# Patient Record
Sex: FEMALE | Race: OTHER | NOT HISPANIC OR LATINO | Employment: FULL TIME | URBAN - METROPOLITAN AREA
[De-identification: names, ages, dates, MRNs, and addresses within clinical notes are randomized per-mention and may not be internally consistent; named-entity substitution may affect disease eponyms.]

---

## 2024-06-02 ENCOUNTER — APPOINTMENT (OUTPATIENT)
Dept: GENERAL RADIOLOGY | Facility: HOSPITAL | Age: 64
End: 2024-06-02
Payer: COMMERCIAL

## 2024-06-02 ENCOUNTER — HOSPITAL ENCOUNTER (EMERGENCY)
Facility: HOSPITAL | Age: 64
Discharge: HOME | End: 2024-06-02
Attending: EMERGENCY MEDICINE | Admitting: EMERGENCY MEDICINE
Payer: COMMERCIAL

## 2024-06-02 ENCOUNTER — APPOINTMENT (OUTPATIENT)
Dept: CT IMAGING | Facility: HOSPITAL | Age: 64
End: 2024-06-02
Payer: COMMERCIAL

## 2024-06-02 VITALS
OXYGEN SATURATION: 97 % | SYSTOLIC BLOOD PRESSURE: 118 MMHG | WEIGHT: 195.5 LBS | RESPIRATION RATE: 20 BRPM | HEART RATE: 59 BPM | HEIGHT: 63 IN | TEMPERATURE: 97.3 F | BODY MASS INDEX: 34.64 KG/M2 | DIASTOLIC BLOOD PRESSURE: 67 MMHG

## 2024-06-02 DIAGNOSIS — R42 DIZZINESS, NONSPECIFIC: Primary | ICD-10-CM

## 2024-06-02 LAB
ALBUMIN SERPL-MCNC: 3.7 G/DL (ref 3.5–5.2)
ALBUMIN/GLOB SERPL: 1.6 G/DL
ALP SERPL-CCNC: 76 U/L (ref 39–117)
ALT SERPL W P-5'-P-CCNC: 19 U/L (ref 1–33)
ANION GAP SERPL CALCULATED.3IONS-SCNC: 9 MMOL/L (ref 5–15)
AST SERPL-CCNC: 16 U/L (ref 1–32)
BACTERIA UR QL AUTO: NORMAL /HPF
BASOPHILS # BLD AUTO: 0.03 10*3/MM3 (ref 0–0.2)
BASOPHILS NFR BLD AUTO: 0.4 % (ref 0–1.5)
BILIRUB SERPL-MCNC: 0.4 MG/DL (ref 0–1.2)
BILIRUB UR QL STRIP: NEGATIVE
BUN SERPL-MCNC: 17 MG/DL (ref 8–23)
BUN/CREAT SERPL: 23.9 (ref 7–25)
CALCIUM SPEC-SCNC: 9 MG/DL (ref 8.6–10.5)
CHLORIDE SERPL-SCNC: 103 MMOL/L (ref 98–107)
CLARITY UR: CLEAR
CO2 SERPL-SCNC: 26 MMOL/L (ref 22–29)
COLOR UR: YELLOW
CREAT SERPL-MCNC: 0.71 MG/DL (ref 0.57–1)
DEPRECATED RDW RBC AUTO: 43 FL (ref 37–54)
EGFRCR SERPLBLD CKD-EPI 2021: 94.5 ML/MIN/1.73
EOSINOPHIL # BLD AUTO: 0.45 10*3/MM3 (ref 0–0.4)
EOSINOPHIL NFR BLD AUTO: 5.8 % (ref 0.3–6.2)
ERYTHROCYTE [DISTWIDTH] IN BLOOD BY AUTOMATED COUNT: 12 % (ref 12.3–15.4)
GLOBULIN UR ELPH-MCNC: 2.3 GM/DL
GLUCOSE BLDC GLUCOMTR-MCNC: 120 MG/DL (ref 70–130)
GLUCOSE SERPL-MCNC: 116 MG/DL (ref 65–99)
GLUCOSE UR STRIP-MCNC: NEGATIVE MG/DL
HCT VFR BLD AUTO: 44.8 % (ref 34–46.6)
HGB BLD-MCNC: 14 G/DL (ref 12–15.9)
HGB UR QL STRIP.AUTO: NEGATIVE
HOLD SPECIMEN: NORMAL
HYALINE CASTS UR QL AUTO: NORMAL /LPF
IMM GRANULOCYTES # BLD AUTO: 0.04 10*3/MM3 (ref 0–0.05)
IMM GRANULOCYTES NFR BLD AUTO: 0.5 % (ref 0–0.5)
KETONES UR QL STRIP: NEGATIVE
LEUKOCYTE ESTERASE UR QL STRIP.AUTO: ABNORMAL
LYMPHOCYTES # BLD AUTO: 2.69 10*3/MM3 (ref 0.7–3.1)
LYMPHOCYTES NFR BLD AUTO: 34.5 % (ref 19.6–45.3)
MCH RBC QN AUTO: 29.8 PG (ref 26.6–33)
MCHC RBC AUTO-ENTMCNC: 31.3 G/DL (ref 31.5–35.7)
MCV RBC AUTO: 95.3 FL (ref 79–97)
MONOCYTES # BLD AUTO: 0.52 10*3/MM3 (ref 0.1–0.9)
MONOCYTES NFR BLD AUTO: 6.7 % (ref 5–12)
NEUTROPHILS NFR BLD AUTO: 4.06 10*3/MM3 (ref 1.7–7)
NEUTROPHILS NFR BLD AUTO: 52.1 % (ref 42.7–76)
NITRITE UR QL STRIP: NEGATIVE
PH UR STRIP.AUTO: 6.5 [PH] (ref 5–8)
PLATELET # BLD AUTO: 251 10*3/MM3 (ref 140–450)
PMV BLD AUTO: 10.4 FL (ref 6–12)
POTASSIUM SERPL-SCNC: 4.1 MMOL/L (ref 3.5–5.2)
PROT SERPL-MCNC: 6 G/DL (ref 6–8.5)
PROT UR QL STRIP: NEGATIVE
QT INTERVAL: 418 MS
QTC INTERVAL: 415 MS
RBC # BLD AUTO: 4.7 10*6/MM3 (ref 3.77–5.28)
RBC # UR STRIP: NORMAL /HPF
REF LAB TEST METHOD: NORMAL
SODIUM SERPL-SCNC: 138 MMOL/L (ref 136–145)
SP GR UR STRIP: 1.01 (ref 1–1.03)
SQUAMOUS #/AREA URNS HPF: NORMAL /HPF
TROPONIN T SERPL HS-MCNC: 7 NG/L
UROBILINOGEN UR QL STRIP: ABNORMAL
WBC # UR STRIP: NORMAL /HPF
WBC NRBC COR # BLD AUTO: 7.79 10*3/MM3 (ref 3.4–10.8)
WHOLE BLOOD HOLD COAG: NORMAL
WHOLE BLOOD HOLD SPECIMEN: NORMAL

## 2024-06-02 PROCEDURE — 36415 COLL VENOUS BLD VENIPUNCTURE: CPT

## 2024-06-02 PROCEDURE — 25810000003 SODIUM CHLORIDE 0.9 % SOLUTION: Performed by: NURSE PRACTITIONER

## 2024-06-02 PROCEDURE — 93005 ELECTROCARDIOGRAM TRACING: CPT | Performed by: EMERGENCY MEDICINE

## 2024-06-02 PROCEDURE — 70450 CT HEAD/BRAIN W/O DYE: CPT

## 2024-06-02 PROCEDURE — 96360 HYDRATION IV INFUSION INIT: CPT

## 2024-06-02 PROCEDURE — 81001 URINALYSIS AUTO W/SCOPE: CPT | Performed by: EMERGENCY MEDICINE

## 2024-06-02 PROCEDURE — 71045 X-RAY EXAM CHEST 1 VIEW: CPT

## 2024-06-02 PROCEDURE — 80053 COMPREHEN METABOLIC PANEL: CPT | Performed by: EMERGENCY MEDICINE

## 2024-06-02 PROCEDURE — 99283 EMERGENCY DEPT VISIT LOW MDM: CPT | Performed by: NURSE PRACTITIONER

## 2024-06-02 PROCEDURE — 93010 ELECTROCARDIOGRAM REPORT: CPT | Performed by: EMERGENCY MEDICINE

## 2024-06-02 PROCEDURE — 85025 COMPLETE CBC W/AUTO DIFF WBC: CPT | Performed by: EMERGENCY MEDICINE

## 2024-06-02 PROCEDURE — 99284 EMERGENCY DEPT VISIT MOD MDM: CPT

## 2024-06-02 PROCEDURE — 84484 ASSAY OF TROPONIN QUANT: CPT | Performed by: EMERGENCY MEDICINE

## 2024-06-02 PROCEDURE — 82948 REAGENT STRIP/BLOOD GLUCOSE: CPT

## 2024-06-02 RX ORDER — SODIUM CHLORIDE 0.9 % (FLUSH) 0.9 %
10 SYRINGE (ML) INJECTION AS NEEDED
Status: DISCONTINUED | OUTPATIENT
Start: 2024-06-02 | End: 2024-06-02 | Stop reason: HOSPADM

## 2024-06-02 RX ADMIN — SODIUM CHLORIDE 1000 ML: 9 INJECTION, SOLUTION INTRAVENOUS at 09:27

## 2024-06-02 NOTE — FSED PROVIDER NOTE
Subjective   History of Present Illness  The patient is a 64-year-old female who presents to the ER with intermittent dizziness for the past 3 weeks.  Patient reports she has been given Antivert by here primary care, but she has not been taking.  Patient denies any nausea, vomiting or chest pain. Reports got up this morning was dizzy and fell.     History provided by:  Patient and spouse   used: No        Review of Systems   Neurological:  Positive for dizziness.       History reviewed. No pertinent past medical history.    No Known Allergies    History reviewed. No pertinent surgical history.    History reviewed. No pertinent family history.    Social History     Socioeconomic History    Marital status:    Tobacco Use    Smoking status: Never    Smokeless tobacco: Never   Vaping Use    Vaping status: Never Used   Substance and Sexual Activity    Alcohol use: Not Currently    Drug use: Never    Sexual activity: Defer           Objective   Physical Exam  Vitals and nursing note reviewed.   Constitutional:       Appearance: Normal appearance. She is normal weight.   HENT:      Head: Normocephalic.      Right Ear: Tympanic membrane, ear canal and external ear normal.      Left Ear: Tympanic membrane, ear canal and external ear normal.      Nose: Nose normal.      Mouth/Throat:      Lips: Pink.      Mouth: Mucous membranes are moist.      Pharynx: Oropharynx is clear. Uvula midline.   Eyes:      Conjunctiva/sclera: Conjunctivae normal.      Pupils: Pupils are equal, round, and reactive to light.   Cardiovascular:      Rate and Rhythm: Normal rate and regular rhythm.      Pulses: Normal pulses.      Heart sounds: Normal heart sounds.   Pulmonary:      Effort: Pulmonary effort is normal.      Breath sounds: Normal breath sounds.   Abdominal:      Palpations: Abdomen is soft.   Musculoskeletal:         General: Normal range of motion.      Cervical back: Full passive range of motion without pain,  normal range of motion and neck supple.   Skin:     General: Skin is warm and dry.   Neurological:      General: No focal deficit present.      Mental Status: She is alert and oriented to person, place, and time.      GCS: GCS eye subscore is 4. GCS verbal subscore is 5. GCS motor subscore is 6.      Sensory: Sensation is intact.      Motor: Motor function is intact.      Coordination: Coordination is intact.      Gait: Gait is intact.      Comments: Patient ambulatory without any problems.   Psychiatric:         Mood and Affect: Mood normal.         Behavior: Behavior normal. Behavior is cooperative.         Procedures           ED Course  ED Course as of 06/02/24 1346   Sun Jun 02, 2024   0916 WBC: 7.79 [DS]   0916 Hemoglobin: 14.0 [DS]   0916 Hematocrit: 44.8 [DS]   0949 HS Troponin T: 7 [DS]   0949 Glucose(!): 116 [DS]   0949 BUN: 17 [DS]   0949 Creatinine: 0.71 [DS]   0949 Sodium: 138 [DS]   0949 Potassium: 4.1 [DS]   0950 XR CHEST 1 VW     Date of Exam: 6/2/2024 9:25 AM EDT     Indication: Weak/Dizzy/AMS triage protocol     Comparison: 6/23/2023 at 1623 hours     FINDINGS:  No consolidations or pleural effusions are observed. The cardiac silhouette is within normal limits for size. The mediastinum is unremarkable. No acute osseous abnormalities are identified on this single view.     IMPRESSION:  1.No evidence for acute cardiopulmonary process.   [DS]   1025 Leukocytes, UA(!): Trace [DS]   1026 CT HEAD WO CONTRAST     Date of Exam: 6/2/2024 10:00 AM EDT     Indication: intermittent dizziness..     Comparison: None available.     Technique: Axial CT images were obtained of the head without contrast administration.  Coronal reconstructions were performed.  Automated exposure control and iterative reconstruction methods were used.        Findings:  No midline shift. Basal cisterns appear patent.  Ventricles and sulci appear within normal limits for patient age.     No extra-axial collection or acute hemorrhage is  identified.  No definite mass lesion, edema, or significant mass effect is seen.  There is subtle hypoattenuation in the cerebral white matter, which is nonspecific, but is most commonly seen with chronic   small vessel ischemic changes. No definite CT findings of acute infarction.     Paranasal sinuses appear clear.  Mastoid air cells appear clear.  No acute calvarial abnormality is identified.     IMPRESSION:  Impression:  1.No definite CT findings of acute intracranial abnormality. MRI may be considered for additional evaluation as clinically indicated.  2.Subtle hypoattenuation in the cerebral white matter which is most commonly associated with mild chronic small vessel ischemic changes, not unexpected for patient's age.   [DS]      ED Course User Index  [DS] Zohreh Mcdonald APRN                                           Medical Decision Making  he patient is a 64-year-old female who presents to the ER with intermittent dizziness for the past 3 weeks.  Patient reports she has been given Antivert by here primary care, but she has not been taking.  Patient denies any nausea, vomiting or chest pain. Reports got up this morning was dizzy and fell.     This patient presents with dizziness, most consistent with a peripheral cause. No history of recent infection so doubt vestibular neuritis. History not consistent with meniere's disease. No history of trauma. No red flag features for central vertigo to include gradual onset, vertical/bidirectional or non-fatigable nystagmus, focal neurologic findings on exam (including inability to ambulate, ataxia, dysmetria). Presentation not consistent with an acute CNS infection, vertebral basilar artery insufficiency, cerebellar hemorrhage or infarction, intracranial mass or bleed.    CT scan of the head, chest x-ray is unremarkable.    Patient's blood work is unremarkable.  Urine shows a trace of leukocytes.    Advised patient to follow-up with primary care for further  evaluation and treatment.  To make sure that she gets up slowly from a standing or lying position.  Advised patient that she needs to take the medication that her primary care gave her to see if this will help.          Problems Addressed:  Dizziness, nonspecific: complicated acute illness or injury    Amount and/or Complexity of Data Reviewed  Labs: ordered. Decision-making details documented in ED Course.  Radiology: ordered. Decision-making details documented in ED Course.  ECG/medicine tests: ordered.    Risk  OTC drugs.        Final diagnoses:   Dizziness, nonspecific       ED Disposition  ED Disposition       ED Disposition   Discharge    Condition   Stable    Comment   --               PATIENT CONNECTION - [facility] [address]  [phone]  In 1 week  As needed, If symptoms worsen, if you call this number they will help you find a primary care physician if needed.         Medication List      No changes were made to your prescriptions during this visit.

## 2024-06-02 NOTE — DISCHARGE INSTRUCTIONS
Follow-up with primary care for further evaluation and treatment, call for follow-up appointment.  If you continue to have the dizziness they may want to refer you to neurology.    Take you medication for dizziness that your physician prescribed for you     When you get up from sitting or standing make sure you are getting up slowly.     If you are concerned about your glasses you can call another eye doctor office, and get a second opinion.      Tylenol/Motrin as needed for pain/fevers    Make sure patient is drinking plenty of fluids.    Return for any new or worsening symptoms.

## 2024-07-05 PROBLEM — R73.03 PREDIABETES: Status: ACTIVE | Noted: 2021-05-28

## 2024-07-05 PROBLEM — I89.0 LYMPHEDEMA OF LOWER EXTREMITY: Status: ACTIVE | Noted: 2021-08-05

## 2024-07-05 PROBLEM — E66.811 OBESITY, CLASS I, BMI 30-34.9: Status: ACTIVE | Noted: 2024-07-05

## 2024-07-05 PROBLEM — E78.00 HIGH CHOLESTEROL: Status: ACTIVE | Noted: 2024-07-05

## 2024-07-05 PROBLEM — E66.9 OBESITY, CLASS I, BMI 30-34.9: Status: ACTIVE | Noted: 2024-07-05

## 2024-07-08 ENCOUNTER — CONSULT (OUTPATIENT)
Dept: BARIATRICS/WEIGHT MGMT | Facility: CLINIC | Age: 64
End: 2024-07-08
Payer: COMMERCIAL

## 2024-07-08 VITALS
TEMPERATURE: 97.8 F | HEIGHT: 63 IN | WEIGHT: 199 LBS | HEART RATE: 66 BPM | DIASTOLIC BLOOD PRESSURE: 70 MMHG | SYSTOLIC BLOOD PRESSURE: 121 MMHG | BODY MASS INDEX: 35.26 KG/M2

## 2024-07-08 DIAGNOSIS — E78.00 HIGH CHOLESTEROL: ICD-10-CM

## 2024-07-08 DIAGNOSIS — E66.9 OBESITY, CLASS II, BMI 35-39.9: Primary | ICD-10-CM

## 2024-07-08 DIAGNOSIS — R73.03 PREDIABETES: ICD-10-CM

## 2024-07-08 PROBLEM — E66.812 OBESITY, CLASS II, BMI 35-39.9: Status: ACTIVE | Noted: 2024-07-05

## 2024-07-08 PROBLEM — E66.811 OBESITY, CLASS I, BMI 30-34.9: Status: RESOLVED | Noted: 2024-07-05 | Resolved: 2024-07-08

## 2024-07-08 PROCEDURE — 99215 OFFICE O/P EST HI 40 MIN: CPT | Performed by: NURSE PRACTITIONER

## 2024-07-08 PROCEDURE — 99417 PROLNG OP E/M EACH 15 MIN: CPT | Performed by: NURSE PRACTITIONER

## 2024-07-08 RX ORDER — ROSUVASTATIN CALCIUM 10 MG/1
10 TABLET, COATED ORAL
COMMUNITY
Start: 2024-06-13

## 2024-07-08 RX ORDER — MELATONIN
1000 DAILY
COMMUNITY

## 2024-07-08 RX ORDER — MECLIZINE HYDROCHLORIDE 25 MG/1
25 TABLET ORAL
COMMUNITY
Start: 2024-05-13

## 2024-07-08 NOTE — PROGRESS NOTES
[facility]  [address]  [place] [id number] [phone]  [address]  [place] [id number] [phone]  Dept: [phone]  7/8/2024      Kenzie Kwok.  [id number]  [id number]  1960  female      Chief Complaint of weight gain; unable to maintain weight loss    History of Present Illness:   Kenzie is a 64 y.o. female who presents today for evaluation, education and consultation regarding weight loss with the supervision of a medical specialist and registered dietitian.     Diet History:Kenzie has been overweight for at least 5 years. She denies any past history of focused weight loss or diet attempts.  Kenzie describes her eating habits as eating at lot of sweets, drinking coffee, using food to cope with anxiety. Kenzie Kwok has tried exercising among others with success of losing up to 0-5 pounds, but in each instance regained the weight.     See dietician documentation for complete history.    She has a home gym at her house, she works in the evenings. She sleeps 4-5 hours per night and has always been that way. She takes care of her 7yo grandson but he has recently started school. She picks up around 4pm and keeps him until his parents get home.     She eats a solid breakfast after waking up- shake. She takes lunch to work- fruit, yogurt, or cereal. She has a solid meal after she gets off work, will lead with rice, chicken, beans, and fish. She works 7pm-7am, she is bed by 8:30 am and wakes up around 1pm.     She primarily drinks water, but doesn't drink much of anything. She occasionally has coffee. She does have a lot of fluid retention but cannot tolerate a water pill due to her blood pressure dropping.     Bariatric Surgery Evaluation: The patient is being seen for an initial visit for weight evaluation and education.     Bariatric Co-morbidities:  dyslipidemia and prediabetes    Patient Active Problem List    Diagnosis    High cholesterol    Lymphedema of lower extremity    Prediabetes    Obesity, Class II, BMI 35-39.9       No past medical history on file.    Past Surgical History:   Procedure Laterality Date    ABDOMINAL HYSTERECTOMY       SECTION      COLONOSCOPY         Allergies   Allergen Reactions    Acetaminophen Provider Review Needed         Current Outpatient Medications:     meclizine (ANTIVERT) 25 MG tablet, Take 1 tablet by mouth., Disp: , Rfl:     rosuvastatin (CRESTOR) 10 MG tablet, Take 1 tablet by mouth every night at bedtime., Disp: , Rfl:     cholecalciferol (Vitamin D, Cholecalciferol,) 25 MCG (1000 UT) tablet, Take 1 tablet by mouth Daily., Disp: , Rfl:     Multiple Vitamins-Minerals (CENTRUM MINIS WOMEN 50+ PO), Take 1 tablet by mouth Daily., Disp: , Rfl:     Social History     Socioeconomic History    Marital status:    Tobacco Use    Smoking status: Never    Smokeless tobacco: Never   Vaping Use    Vaping status: Never Used   Substance and Sexual Activity    Alcohol use: Not Currently    Drug use: Never    Sexual activity: Defer       Family History   Problem Relation Age of Onset    Cancer Mother     Stroke Father          Review of Systems:  Review of Systems   Constitutional:  Positive for fatigue.   HENT: Negative.     Respiratory: Negative.     Cardiovascular:  Positive for leg swelling.   Gastrointestinal: Negative.    Endocrine: Negative.    Genitourinary: Negative.    Musculoskeletal:  Negative for back pain.   Skin: Negative.    Neurological:  Positive for dizziness.   Psychiatric/Behavioral: Negative.         Physical Exam:  Vital Signs:  Weight: 90.3 kg (199 lb)   Body mass index is 35.26 kg/m².  Temp: 97.8 °F (36.6 °C)   Heart Rate: 66   BP: 121/70     Physical Exam  Vitals and nursing note reviewed.   Constitutional:       Appearance: She is well-developed.   Neck:      Thyroid: No thyromegaly.   Cardiovascular:      Rate and Rhythm: Normal rate.   Pulmonary:       Effort: Pulmonary effort is normal. No respiratory distress.   Abdominal:      Palpations: Abdomen is soft.   Musculoskeletal:         General: No tenderness.      Right lower leg: Edema present.      Left lower leg: Edema present.   Skin:     General: Skin is warm and dry.   Neurological:      Mental Status: She is alert.   Psychiatric:         Behavior: Behavior normal.            Assessment:         Kenzie Kwok is a 64 y.o. year old female with an elevated BMI. Weight: 90.3 kg (199 lb), Body mass index is 35.26 kg/m². and weight related problems including dyslipidemia and prediabetes.    Recent test result were reviewed with the patient including CBC and CMP dated 6/2/24  and  Hba1c and TSH were ordered at this time. These will be drawn and patient will be notified with results.     Kenzie Kwok was screened for sleep apnea in our office today and based on their results she is low risk for BABAK. The risks, as they relate to chronic hypercapnia r/t untreated BABAK were discussed with the patient and She verbalized understanding.     The patient was advised to start a high protein, low fat and low carbohydrate diet  start routine exercise including but not limited to 150 minutes per week. The patient was given individualized information by our dietician along with handouts.     I think she is a good candidate for regular follow up and treatment as a participant in our medical weight loss program.    Encounter Diagnoses   Name Primary?    Obesity, Class II, BMI 35-39.9 Yes    High cholesterol        Plan:  Patient did receive group education by our bariatric dieitian today and will undergo individual evaluation with assistance in developing an individualized dietary plan in two weeks time. The patient and I discussed her daily routine including diet recall, work schedule, home life with a focus on opportunities for lifestyle changes related to physical activity, meal planning, and healthy eating. Education was  provided regarding logging dietary intake either by manually recording intake or using a dietary tracking dean with a focus on macronutrient intake including protein, fat, and carbohydrates. Discussed the importance of focusing on nutrient dense foods, minimally processed foods high in protein and fiber content to support earlier and prolonged fullness and satiety as well as the importance of limiting and moderating processed and high glycemic index foods and how to couple small amounts of these with fiber, protein and healthy fat to stabilize blood sugar. Discussed the importance of sleep hygiene and exercise as they relate to weight maintenance, cardiovascular health as well as stress mediation and reducing inflammation. Generally discussed weight loss program options regarding different diet plans.     Patient was advised to focus on tracking dietary intake manually or via tracking dean for the next two weeks until individual follow up with the dietitian. At which time, She will decide which dietary plan She would like to follow.     Goals for changes prior to next appointment include keeping a diet log which She will bring to female next appointment. Will focus on leading with protein at each meal, getting three meals daily.    Total time spent during this encounter today was 65 minutes and includes preparing for the visit, reviewing tests, performing a medically appropriate examination and/or evaluations, counseling and educating the patient/family/caregiver, ordering medications, tests, or procedures, documenting information in the medical record, and independently interpreting results and communicating that information with the patient/family/caregiver  HEIDY Orozco  7/8/2024

## 2024-07-23 ENCOUNTER — OFFICE VISIT (OUTPATIENT)
Dept: BARIATRICS/WEIGHT MGMT | Facility: CLINIC | Age: 64
End: 2024-07-23
Payer: COMMERCIAL

## 2024-07-23 VITALS — WEIGHT: 197 LBS | BODY MASS INDEX: 34.91 KG/M2

## 2024-07-23 DIAGNOSIS — E66.9 OBESITY, CLASS I, BMI 30-34.9: Primary | ICD-10-CM

## 2024-07-23 PROCEDURE — 97802 MEDICAL NUTRITION INDIV IN: CPT

## 2024-07-23 NOTE — PROGRESS NOTES
"Medical Weight Loss Nutrition Follow Up    Patient Name: Kenzie Kwok    YOB: 1960   Age: 64 y.o.  Sex: female  MRN: [id number]     ASSESSMENT    Anthropometrics  Estimated body mass index is 34.91 kg/m² as calculated from the following:    Height as of 7/8/24: 160 cm (62.99\").    Weight as of this encounter: 89.4 kg (197 lb).    Visit Narrative  Kenzie Kwok is participating in medical weight loss program under the supervision of a medical specialist and registered dietitian. Met with patient today for nutrition follow-up. Todays weight is 197 lbs, which is a loss of 2  lbs. She has attended initial consultation and group nutrition education. The patient states they are making efforts to follow the recommendations given at intake appointment including high lean protein, low carbohydrate and low fat diet. They are working on increasing fruits and vegetable intake, decreasing portion sizes of higher calorie foods and drinking 64 oz. water daily. Patient is working on reducing intake of fast foods, fried foods, sweets and high calorie beverages.    Patient states they have made positive changes including leading with protein at meals and eating smaller portions of rice and bread. She has started working with a  3 times per week for exercise. The patient feels like things are going well and her clothes are fitting better. She is interested in medication as an additional tool for weight loss.       Diet Review   Patient is eating 3 meals and 1-2 snacks daily.       24 Hour Recall   Breakfast: Eggs and bread   Lunch: [place], salad    Dinner: Meat, salad and occasionally rice    Snacks: Fruit, zero sugar yogurt   Beverages: Water, coffee   Vitamins/supplements: MVI   Dietary restrictions: NKFA     DIAGNOSIS    Nutrition problem statement: Obesity related to multifactorial biochemical, behavioral and environmental contributors to disease as evidenced by BMI 34.91 " kg/m².    INTERVENTION    I reviewed the appropriate dietary choices with the patient and provided guidance and suggestions for making necessary changes. Discussed sustainable habit changes and setting small, achievable goals that can be maintained long term. Recommended focus on eating protein first and aim for minimum of 70-80 grams per day. Reviewed macros and portion plate model for guidelines on putting together balanced meals. Suggested the option of keeping a food journal which will help patient become more aware of the nutritional value of food, establish starting point and identify areas for improvement. Aim for at least 64 oz water daily Be sure to do routine exercise, 150 minutes per week minimum, including both cardio and strength training. Offered follow up for support and accountability.      MONITORING & EVALUATION    Patient goals:   Remain consistent with healthy eating and exercise  Aim for 64 oz water daily     Follow-Up:  appointment scheduled with APRN in 2 weeks     Total time spent during this encounter today was 30 minutes and includes preparing for the visit, reviewing tests, counseling and educating the patient/family/caregiver and documenting information in the medical record.    Electronically signed by:  Jael Bradford RD   07/23/24 15:48 EDT

## 2024-10-02 ENCOUNTER — HOSPITAL ENCOUNTER (EMERGENCY)
Facility: HOSPITAL | Age: 64
Discharge: HOME | End: 2024-10-02
Attending: EMERGENCY MEDICINE
Payer: COMMERCIAL

## 2024-10-02 ENCOUNTER — APPOINTMENT (OUTPATIENT)
Dept: CT IMAGING | Facility: HOSPITAL | Age: 64
End: 2024-10-02
Payer: COMMERCIAL

## 2024-10-02 VITALS
DIASTOLIC BLOOD PRESSURE: 62 MMHG | SYSTOLIC BLOOD PRESSURE: 124 MMHG | RESPIRATION RATE: 16 BRPM | BODY MASS INDEX: 34.34 KG/M2 | WEIGHT: 193.8 LBS | OXYGEN SATURATION: 98 % | HEART RATE: 76 BPM | TEMPERATURE: 98 F | HEIGHT: 63 IN

## 2024-10-02 DIAGNOSIS — R42 DIZZINESS: Primary | ICD-10-CM

## 2024-10-02 LAB
ALBUMIN SERPL-MCNC: 4.1 G/DL (ref 3.5–5.2)
ALBUMIN/GLOB SERPL: 1.6 G/DL
ALP SERPL-CCNC: 76 U/L (ref 39–117)
ALT SERPL W P-5'-P-CCNC: 20 U/L (ref 1–33)
ANION GAP SERPL CALCULATED.3IONS-SCNC: 7.7 MMOL/L (ref 5–15)
AST SERPL-CCNC: 21 U/L (ref 1–32)
BASOPHILS # BLD AUTO: 0.04 10*3/MM3 (ref 0–0.2)
BASOPHILS NFR BLD AUTO: 0.4 % (ref 0–1.5)
BILIRUB SERPL-MCNC: 0.3 MG/DL (ref 0–1.2)
BUN SERPL-MCNC: 16 MG/DL (ref 8–23)
BUN/CREAT SERPL: 20.5 (ref 7–25)
CALCIUM SPEC-SCNC: 9.6 MG/DL (ref 8.6–10.5)
CHLORIDE SERPL-SCNC: 105 MMOL/L (ref 98–107)
CO2 SERPL-SCNC: 29.3 MMOL/L (ref 22–29)
CREAT SERPL-MCNC: 0.78 MG/DL (ref 0.57–1)
DEPRECATED RDW RBC AUTO: 41.8 FL (ref 37–54)
EGFRCR SERPLBLD CKD-EPI 2021: 84.4 ML/MIN/1.73
EOSINOPHIL # BLD AUTO: 0.18 10*3/MM3 (ref 0–0.4)
EOSINOPHIL NFR BLD AUTO: 2 % (ref 0.3–6.2)
ERYTHROCYTE [DISTWIDTH] IN BLOOD BY AUTOMATED COUNT: 11.9 % (ref 12.3–15.4)
GLOBULIN UR ELPH-MCNC: 2.6 GM/DL
GLUCOSE SERPL-MCNC: 129 MG/DL (ref 65–99)
HCT VFR BLD AUTO: 44.7 % (ref 34–46.6)
HGB BLD-MCNC: 14.3 G/DL (ref 12–15.9)
HOLD SPECIMEN: NORMAL
HOLD SPECIMEN: NORMAL
IMM GRANULOCYTES # BLD AUTO: 0.04 10*3/MM3 (ref 0–0.05)
IMM GRANULOCYTES NFR BLD AUTO: 0.4 % (ref 0–0.5)
LIPASE SERPL-CCNC: 40 U/L (ref 13–60)
LYMPHOCYTES # BLD AUTO: 1.53 10*3/MM3 (ref 0.7–3.1)
LYMPHOCYTES NFR BLD AUTO: 17 % (ref 19.6–45.3)
MCH RBC QN AUTO: 30 PG (ref 26.6–33)
MCHC RBC AUTO-ENTMCNC: 32 G/DL (ref 31.5–35.7)
MCV RBC AUTO: 93.9 FL (ref 79–97)
MONOCYTES # BLD AUTO: 0.4 10*3/MM3 (ref 0.1–0.9)
MONOCYTES NFR BLD AUTO: 4.4 % (ref 5–12)
NEUTROPHILS NFR BLD AUTO: 6.83 10*3/MM3 (ref 1.7–7)
NEUTROPHILS NFR BLD AUTO: 75.8 % (ref 42.7–76)
NRBC BLD AUTO-RTO: 0 /100 WBC (ref 0–0.2)
PLATELET # BLD AUTO: 247 10*3/MM3 (ref 140–450)
PMV BLD AUTO: 10.9 FL (ref 6–12)
POTASSIUM SERPL-SCNC: 4.5 MMOL/L (ref 3.5–5.2)
PROT SERPL-MCNC: 6.7 G/DL (ref 6–8.5)
RBC # BLD AUTO: 4.76 10*6/MM3 (ref 3.77–5.28)
SODIUM SERPL-SCNC: 142 MMOL/L (ref 136–145)
TROPONIN T SERPL HS-MCNC: 6 NG/L
WBC NRBC COR # BLD AUTO: 9.02 10*3/MM3 (ref 3.4–10.8)
WHOLE BLOOD HOLD COAG: NORMAL
WHOLE BLOOD HOLD SPECIMEN: NORMAL

## 2024-10-02 PROCEDURE — 80053 COMPREHEN METABOLIC PANEL: CPT | Performed by: EMERGENCY MEDICINE

## 2024-10-02 PROCEDURE — 70450 CT HEAD/BRAIN W/O DYE: CPT

## 2024-10-02 PROCEDURE — 99284 EMERGENCY DEPT VISIT MOD MDM: CPT

## 2024-10-02 PROCEDURE — 25810000003 SODIUM CHLORIDE 0.9 % SOLUTION: Performed by: EMERGENCY MEDICINE

## 2024-10-02 PROCEDURE — 93005 ELECTROCARDIOGRAM TRACING: CPT

## 2024-10-02 PROCEDURE — 83690 ASSAY OF LIPASE: CPT | Performed by: EMERGENCY MEDICINE

## 2024-10-02 PROCEDURE — 85025 COMPLETE CBC W/AUTO DIFF WBC: CPT | Performed by: EMERGENCY MEDICINE

## 2024-10-02 PROCEDURE — 93005 ELECTROCARDIOGRAM TRACING: CPT | Performed by: EMERGENCY MEDICINE

## 2024-10-02 PROCEDURE — 84484 ASSAY OF TROPONIN QUANT: CPT | Performed by: EMERGENCY MEDICINE

## 2024-10-02 RX ORDER — SODIUM CHLORIDE 0.9 % (FLUSH) 0.9 %
10 SYRINGE (ML) INJECTION AS NEEDED
Status: DISCONTINUED | OUTPATIENT
Start: 2024-10-02 | End: 2024-10-02 | Stop reason: HOSPADM

## 2024-10-02 RX ADMIN — SODIUM CHLORIDE 1000 ML: 9 INJECTION, SOLUTION INTRAVENOUS at 19:04

## 2024-10-02 NOTE — Clinical Note
[facility] EMERGENCY DEPARTMENT  [address] [place] [address]  Phone: [phone]    Kenzie Kwok was seen and treated in our emergency department on 10/2/2024.  She may return to work on 10/03/2024.         Thank you for choosing [facility].    Domenic Mata MD

## 2024-10-02 NOTE — ED PROVIDER NOTES
"Subjective   History of Present Illness  Chief complaint: Dizziness    64-year-old female presents with dizziness.  She describes it as an off-balance feeling.  She denies any room spinning.  She has had no syncope or near syncope.  She denies chest pain or shortness of breath.  She denies any focal numbness, weakness, tingling.  She does report some mild abdominal bloating but denies any abdominal pain and has had no nausea, vomiting, diarrhea.  She denies fever.  She states her dizziness is worse when she stands up.    History provided by:  Patient      Review of Systems   Constitutional:  Negative for fever.   HENT:  Negative for congestion.    Respiratory:  Negative for cough and shortness of breath.    Cardiovascular:  Negative for chest pain.   Gastrointestinal:  Negative for abdominal pain, diarrhea, nausea and vomiting.   Genitourinary:  Negative for dysuria.   Musculoskeletal:  Negative for back pain.   Neurological:  Positive for dizziness. Negative for headaches.   Psychiatric/Behavioral:  Negative for confusion.        No past medical history on file.    Allergies   Allergen Reactions    Acetaminophen Other (See Comments)     Patient states that it made her feel groggy and shakey       Past Surgical History:   Procedure Laterality Date    ABDOMINAL HYSTERECTOMY       SECTION      COLONOSCOPY         Family History   Problem Relation Age of Onset    Cancer Mother     Stroke Father        Social History     Socioeconomic History    Marital status:    Tobacco Use    Smoking status: Never     Passive exposure: Never    Smokeless tobacco: Never   Vaping Use    Vaping status: Never Used   Substance and Sexual Activity    Alcohol use: Not Currently    Drug use: Never    Sexual activity: Defer     Birth control/protection: Hysterectomy       /62 (BP Location: Right arm, Patient Position: Lying)   Pulse 76   Temp 98 °F (36.7 °C) (Oral)   Resp 16   Ht 160 cm (63\")   Wt 87.9 kg (193 lb " 12.8 oz)   SpO2 98%   BMI 34.33 kg/m²       Objective   Physical Exam  Vitals and nursing note reviewed.   Constitutional:       Appearance: Normal appearance.   HENT:      Head: Normocephalic and atraumatic.      Mouth/Throat:      Mouth: Mucous membranes are moist.   Cardiovascular:      Rate and Rhythm: Normal rate and regular rhythm.      Heart sounds: Normal heart sounds.   Pulmonary:      Effort: Pulmonary effort is normal. No respiratory distress.      Breath sounds: Normal breath sounds.   Abdominal:      Palpations: Abdomen is soft.      Tenderness: There is no abdominal tenderness.   Skin:     General: Skin is warm and dry.   Neurological:      General: No focal deficit present.      Mental Status: She is alert and oriented to person, place, and time.         Procedures           ED Course      My interpretation of EKG shows sinus bradycardia, rate of 56, no ST elevation                           Results for orders placed or performed during the hospital encounter of 10/02/24   Comprehensive Metabolic Panel    Specimen: Blood   Result Value Ref Range    Glucose 129 (H) 65 - 99 mg/dL    BUN 16 8 - 23 mg/dL    Creatinine 0.78 0.57 - 1.00 mg/dL    Sodium 142 136 - 145 mmol/L    Potassium 4.5 3.5 - 5.2 mmol/L    Chloride 105 98 - 107 mmol/L    CO2 29.3 (H) 22.0 - 29.0 mmol/L    Calcium 9.6 8.6 - 10.5 mg/dL    Total Protein 6.7 6.0 - 8.5 g/dL    Albumin 4.1 3.5 - 5.2 g/dL    ALT (SGPT) 20 1 - 33 U/L    AST (SGOT) 21 1 - 32 U/L    Alkaline Phosphatase 76 39 - 117 U/L    Total Bilirubin 0.3 0.0 - 1.2 mg/dL    Globulin 2.6 gm/dL    A/G Ratio 1.6 g/dL    BUN/Creatinine Ratio 20.5 7.0 - 25.0    Anion Gap 7.7 5.0 - 15.0 mmol/L    eGFR 84.4 >60.0 mL/min/1.73   Lipase    Specimen: Blood   Result Value Ref Range    Lipase 40 13 - 60 U/L   Single High Sensitivity Troponin T    Specimen: Blood   Result Value Ref Range    HS Troponin T 6 <14 ng/L   CBC Auto Differential    Specimen: Blood   Result Value Ref Range    WBC  9.02 3.40 - 10.80 10*3/mm3    RBC 4.76 3.77 - 5.28 10*6/mm3    Hemoglobin 14.3 12.0 - 15.9 g/dL    Hematocrit 44.7 34.0 - 46.6 %    MCV 93.9 79.0 - 97.0 fL    MCH 30.0 26.6 - 33.0 pg    MCHC 32.0 31.5 - 35.7 g/dL    RDW 11.9 (L) 12.3 - 15.4 %    RDW-SD 41.8 37.0 - 54.0 fl    MPV 10.9 6.0 - 12.0 fL    Platelets 247 140 - 450 10*3/mm3    Neutrophil % 75.8 42.7 - 76.0 %    Lymphocyte % 17.0 (L) 19.6 - 45.3 %    Monocyte % 4.4 (L) 5.0 - 12.0 %    Eosinophil % 2.0 0.3 - 6.2 %    Basophil % 0.4 0.0 - 1.5 %    Immature Grans % 0.4 0.0 - 0.5 %    Neutrophils, Absolute 6.83 1.70 - 7.00 10*3/mm3    Lymphocytes, Absolute 1.53 0.70 - 3.10 10*3/mm3    Monocytes, Absolute 0.40 0.10 - 0.90 10*3/mm3    Eosinophils, Absolute 0.18 0.00 - 0.40 10*3/mm3    Basophils, Absolute 0.04 0.00 - 0.20 10*3/mm3    Immature Grans, Absolute 0.04 0.00 - 0.05 10*3/mm3    nRBC 0.0 0.0 - 0.2 /100 WBC   ECG 12 Lead Other; dizziness   Result Value Ref Range    QT Interval 428 ms    QTC Interval 415 ms   Green Top (Gel)   Result Value Ref Range    Extra Tube Hold for add-ons.    Lavender Top   Result Value Ref Range    Extra Tube hold    Gold Top - SST   Result Value Ref Range    Extra Tube Hold for add-ons.    Light Blue Top   Result Value Ref Range    Extra Tube Hold for add-ons.      CT Head Without Contrast    Result Date: 10/2/2024  Impression: No acute intracranial pathology. Electronically Signed: Cornelius Castillo MD  10/2/2024 7:33 PM EDT  Workstation ID: [id number]               Medical Decision Making  Amount and/or Complexity of Data Reviewed  Labs: ordered.  Radiology: ordered.  ECG/medicine tests: ordered.    Risk  Prescription drug management.      Patient had the above medication.  Results were discussed with the patient.  CT head shows no acute intracranial malady.  White blood cell count is normal.  CMP and lipase are unremarkable.  Troponin is negative.  EKG is unremarkable.  Orthostatic vital signs were normal.  Patient was given IV fluids.   She is feeling much better after the IV fluids.  She has ambulated without difficulty.  She is no longer having any dizziness.  She will be discharged to follow-up with her primary doctor.      Final diagnoses:   Dizziness       ED Disposition  ED Disposition       ED Disposition   Discharge    Condition   Stable    Comment   --               Pepe Guzmán MD  [address] [facility] [id number] [phone]    Call in 2 days           Medication List      No changes were made to your prescriptions during this visit.            Domenic Mata MD  10/02/24 [id number]

## 2024-10-03 LAB
QT INTERVAL: 428 MS
QTC INTERVAL: 415 MS

## 2024-11-09 ENCOUNTER — TRANSCRIBE ORDERS (OUTPATIENT)
Dept: ADMINISTRATIVE | Facility: HOSPITAL | Age: 64
End: 2024-11-09
Payer: COMMERCIAL

## 2024-11-09 DIAGNOSIS — R42 DIZZINESS: Primary | ICD-10-CM

## 2024-11-10 ENCOUNTER — TRANSCRIBE ORDERS (OUTPATIENT)
Dept: ADMINISTRATIVE | Facility: HOSPITAL | Age: 64
End: 2024-11-10
Payer: COMMERCIAL

## 2024-11-10 DIAGNOSIS — R42 DIZZINESS: Primary | ICD-10-CM

## 2024-11-20 ENCOUNTER — HOSPITAL ENCOUNTER (OUTPATIENT)
Facility: HOSPITAL | Age: 64
Discharge: HOME | End: 2024-11-20
Admitting: NURSE PRACTITIONER
Payer: COMMERCIAL

## 2024-11-20 DIAGNOSIS — R42 DIZZINESS: ICD-10-CM

## 2024-11-20 PROCEDURE — 25510000001 IOPAMIDOL PER 1 ML: Performed by: NURSE PRACTITIONER

## 2024-11-20 PROCEDURE — 70498 CT ANGIOGRAPHY NECK: CPT

## 2024-11-20 PROCEDURE — 70496 CT ANGIOGRAPHY HEAD: CPT

## 2024-11-20 RX ORDER — IOPAMIDOL 755 MG/ML
100 INJECTION, SOLUTION INTRAVASCULAR
Status: COMPLETED | OUTPATIENT
Start: 2024-11-20 | End: 2024-11-20

## 2024-11-20 RX ADMIN — IOPAMIDOL 95 ML: 755 INJECTION, SOLUTION INTRAVENOUS at 17:30

## 2025-03-10 ENCOUNTER — TRANSCRIBE ORDERS (OUTPATIENT)
Dept: ADMINISTRATIVE | Facility: HOSPITAL | Age: 65
End: 2025-03-10
Payer: COMMERCIAL

## 2025-03-10 DIAGNOSIS — Z80.41 FAMILY HISTORY OF OVARIAN CANCER: Primary | ICD-10-CM

## 2025-03-19 ENCOUNTER — HOSPITAL ENCOUNTER (OUTPATIENT)
Dept: ULTRASOUND IMAGING | Facility: HOSPITAL | Age: 65
Discharge: HOME | End: 2025-03-19
Payer: COMMERCIAL

## 2025-03-19 DIAGNOSIS — Z80.41 FAMILY HISTORY OF OVARIAN CANCER: ICD-10-CM

## 2025-03-19 PROCEDURE — 76830 TRANSVAGINAL US NON-OB: CPT

## 2025-03-19 PROCEDURE — 76705 ECHO EXAM OF ABDOMEN: CPT

## 2025-03-19 PROCEDURE — 93976 VASCULAR STUDY: CPT

## 2025-03-19 PROCEDURE — 76856 US EXAM PELVIC COMPLETE: CPT
